# Patient Record
Sex: FEMALE | Race: WHITE | NOT HISPANIC OR LATINO | URBAN - METROPOLITAN AREA
[De-identification: names, ages, dates, MRNs, and addresses within clinical notes are randomized per-mention and may not be internally consistent; named-entity substitution may affect disease eponyms.]

---

## 2017-05-12 ENCOUNTER — OFFICE VISIT - RIVER FALLS (OUTPATIENT)
Dept: FAMILY MEDICINE | Facility: CLINIC | Age: 47
End: 2017-05-12

## 2017-05-12 ASSESSMENT — MIFFLIN-ST. JEOR: SCORE: 1205.09

## 2017-08-22 ENCOUNTER — OFFICE VISIT - RIVER FALLS (OUTPATIENT)
Dept: FAMILY MEDICINE | Facility: CLINIC | Age: 47
End: 2017-08-22

## 2017-08-22 ASSESSMENT — MIFFLIN-ST. JEOR: SCORE: 1204.41

## 2017-08-25 ENCOUNTER — TRANSFERRED RECORDS (OUTPATIENT)
Dept: HEALTH INFORMATION MANAGEMENT | Facility: CLINIC | Age: 47
End: 2017-08-25

## 2017-08-25 LAB — HPV ABSTRACT: NORMAL

## 2017-09-06 ENCOUNTER — OFFICE VISIT - RIVER FALLS (OUTPATIENT)
Dept: FAMILY MEDICINE | Facility: CLINIC | Age: 47
End: 2017-09-06

## 2017-09-26 ENCOUNTER — OFFICE VISIT - RIVER FALLS (OUTPATIENT)
Dept: FAMILY MEDICINE | Facility: CLINIC | Age: 47
End: 2017-09-26

## 2017-09-26 ASSESSMENT — MIFFLIN-ST. JEOR: SCORE: 1225.28

## 2018-02-23 ENCOUNTER — OFFICE VISIT - RIVER FALLS (OUTPATIENT)
Dept: FAMILY MEDICINE | Facility: CLINIC | Age: 48
End: 2018-02-23

## 2018-08-27 ENCOUNTER — OFFICE VISIT - RIVER FALLS (OUTPATIENT)
Dept: FAMILY MEDICINE | Facility: CLINIC | Age: 48
End: 2018-08-27

## 2018-08-27 ASSESSMENT — MIFFLIN-ST. JEOR: SCORE: 1265.42

## 2022-02-11 VITALS
BODY MASS INDEX: 25.23 KG/M2 | HEART RATE: 64 BPM | DIASTOLIC BLOOD PRESSURE: 70 MMHG | WEIGHT: 147.8 LBS | HEIGHT: 64 IN | SYSTOLIC BLOOD PRESSURE: 122 MMHG

## 2022-02-12 VITALS
SYSTOLIC BLOOD PRESSURE: 120 MMHG | WEIGHT: 135 LBS | HEART RATE: 72 BPM | BODY MASS INDEX: 22.86 KG/M2 | DIASTOLIC BLOOD PRESSURE: 68 MMHG | DIASTOLIC BLOOD PRESSURE: 82 MMHG | SYSTOLIC BLOOD PRESSURE: 110 MMHG | TEMPERATURE: 97.8 F | WEIGHT: 137.2 LBS | HEIGHT: 65 IN | HEART RATE: 80 BPM | BODY MASS INDEX: 22.47 KG/M2

## 2022-02-12 VITALS
HEIGHT: 65 IN | WEIGHT: 131 LBS | SYSTOLIC BLOOD PRESSURE: 126 MMHG | OXYGEN SATURATION: 99 % | HEART RATE: 87 BPM | DIASTOLIC BLOOD PRESSURE: 72 MMHG | BODY MASS INDEX: 21.83 KG/M2 | TEMPERATURE: 98.1 F

## 2022-02-12 VITALS
OXYGEN SATURATION: 97 % | WEIGHT: 143.2 LBS | SYSTOLIC BLOOD PRESSURE: 122 MMHG | HEART RATE: 71 BPM | BODY MASS INDEX: 23.83 KG/M2 | TEMPERATURE: 98 F | DIASTOLIC BLOOD PRESSURE: 86 MMHG

## 2022-02-12 VITALS
TEMPERATURE: 98.4 F | DIASTOLIC BLOOD PRESSURE: 82 MMHG | BODY MASS INDEX: 22.09 KG/M2 | HEIGHT: 65 IN | HEART RATE: 78 BPM | SYSTOLIC BLOOD PRESSURE: 122 MMHG | WEIGHT: 132.6 LBS

## 2022-02-15 NOTE — PROGRESS NOTES
Chief Complaint    Patient presents for medication check per P-feeling more calm.  History of Present Illness      Patient is here today to follow-up on her mood.  She is now stopped venlafaxine and Wellbutrin.  She has been taking duloxetine 60 mg in the morning and 30 mg in the evening.  She reports that she feels more calm and that she now thinks that she did not realize just how anxious she was in for how long she had been so anxious.  She wonders if she might have too little anxiety because she does not have quite as much of a drive to get things done but she reports she is still able to get things done that need to be done.  Her son is currently and a day program and patient reports that things are going well with this.  She also reports that her daughter is doing much better on her medication so the school year is going well so far.  We reviewed together dosing for the duloxetine and that we have not given duloxetine 60 mg a day and adequate therapeutic trial.  She has had no suicidal ideation and no homicidal ideation.       Review of systems negative except for HPI.  On exam general patient is alert and oriented ×3 in no acute distress.  HEENT pupils are equal round reactive to light mucous membranes are moist and pink hearing is grossly normal chest has bilateral rise with no increased work of breathing cardiovascular normal perfusion musculoskeletal normal gait psych patient dressed appropriately, will clean and well groomed.  She makes normal eye contact.  Her speech is fluent.  Her insight is improved.  She has less psychomotor agitation.  She seems a little blunted and anxious.  These are improved from our last visit.       Assessment and plan generalized anxiety disorder.  Will have patient take duloxetine 60 mg p.o. at bedtime.  Will see how her symptoms do with this 1 month from now.  This will give us a chance to see how adequately 60 mg a day and the duloxetine treat her symptoms.  We may  consider increasing the dose at that time.  In the meantime if her condition worsens she can certainly return to clinic sooner.  15 minutes spent with patient in direct face-to-face contact of which greater than 50% of the time spent counseling patient.  Physical Exam   Vitals & Measurements    T: 97.8(Tympanic)  HR: 72(Peripheral)  BP: 110/68     HT: 64.5 in  WT: 137.2 lb  BMI: 23.18   Assessment/Plan       Anxiety, generalized         Ordered:          venlafaxine, 1 cap(s) ( 37.5 mg ), PO, Daily, # 30 cap(s), 0 Refill(s), Type: Maintenance, Pharmacy: [facility] [id number], 1 cap(s) po daily          33509 office outpatient visit 15 minutes (Charge), Quantity: 1, Depression, Major  Anxiety, generalized                Depression, Major         Ordered:          venlafaxine, 1 cap(s) ( 37.5 mg ), PO, Daily, # 30 cap(s), 0 Refill(s), Type: Maintenance, Pharmacy: [facility] [id number], 1 cap(s) po daily          06821 office outpatient visit 15 minutes (Charge), Quantity: 1, Depression, Major  Anxiety, generalized                Orders:         DULoxetine, 1 cap(s) ( 60 mg ), po, bid, # 60 cap(s), 1 Refill(s), Type: Hard Stop, Pharmacy: [facility] [id number]         DULoxetine, 1 cap(s) ( 60 mg ), po, bid, # 60 cap(s), 1 Refill(s), Type: Maintenance, Pharmacy: [facility] [address], 1 cap(s) po bid  Problem List/Past Medical History    Ongoing     Anxiety, generalized     Depression, Major     Female infertility    Historical     Spontaneous   Procedure/Surgical History     LASIK (2002)     Nose reconstruction     Uterine surgery for infertility  Medications    ALPRAZolam 0.25 mg oral tablet, 0.25 mg= 1 tab(s), po, daily, PRN, 1 refills    DULoxetine 30 mg oral delayed release capsule, 30 mg= 1 cap(s), po, qhs, 1 refills    DULoxetine 60 mg oral delayed release capsule, 60 mg= 1 cap(s), po, bid, 1 refills    Flonase 50 mcg/inh nasal spray, 2 spray(s), nasal, daily, 1 refills     hydrOXYzine hydrochloride 10 mg oral tablet, See Instructions, PRN, 1 refills    ibuprofen, prn  Allergies    No Known Medication Allergies  Social History    Smoking Status - 09/26/2017     Never smoker     Alcohol - 08/23/2017      Current, 3-4 times per week, 1 drinks/episode average.     Employment and Education - 08/23/2017      Employed, Work/School description: Teacher.  Immunizations      Vaccine Date Status Comments      influenza virus vaccine, inactivated 12/27/2012 Recorded [facility]      tetanus/diphth/pertuss (Tdap) adult/adol 03/26/2012 Recorded [facility]      influenza 12/30/2010 Recorded  Lab Results      Results (Last 90 days)      No results located.

## 2022-02-15 NOTE — TELEPHONE ENCOUNTER
Entered by Simin Reid MD on February 01, 2019 6:48:46 PM CST  ---------------------  From: Simin Reid MD   To: [id number]    Sent: 2/1/2019 6:48:45 PM CST  Subject: Medication Management     ** Not Approved: Patient needs appointment **  DULoxetine (DULOXETINE DR 60MG CAPSULES)  TAKE ONE CAPSULE BY MOUTH EVERY DAY  Qty:  30 cap(s)        Days Supply:  30        Refills:  0          GUIDO     Route To Pharmacy - [facility] [id number]               ------------------------------------------  From: [id number]  To: Simin Reid MD  Sent: January 30, 2019 3:41:05 AM CST  Subject: Medication Management  Due: January 31, 2019 3:41:05 AM CST    ** On Hold Pending Signature **  Drug: DULoxetine (DULoxetine 60 mg oral delayed release capsule)  TAKE 1 CAPSULE BY MOUTH DAILY, PLEASE SEE YOUR DOCTOR BEFORE YOUR NEXT REFILL  Quantity: 30 cap(s)     Days Supply: 0         Refills: 0  Substitutions Allowed  Notes from Pharmacy:     Dispensed Drug: DULoxetine (DULoxetine 60 mg oral delayed release capsule)  TAKE ONE CAPSULE BY MOUTH EVERY DAY  Quantity: 30 cap(s)     Days Supply: 30        Refills: 0  Substitutions Allowed  Notes from Pharmacy:   ------------------------------------------

## 2022-02-15 NOTE — PROGRESS NOTES
Patient:   DOTTY ISAAC            MRN: [id number]            FIN: [id number]               Age:   46 years     Sex:  Female     :  1970   Associated Diagnoses:   Generalized anxiety disorder   Author:   Param Burns MD      Visit Information      Date of Service: 2017 11:32 am  Performing Location: [facility]  Encounter#: [id number]      Primary Care Provider (PCP):  Param Burns MD    NPI# [id number]      Referring Provider:  No referring provider recorded for selected visit.      Chief Complaint   2017 11:36 AM CDT   Patient presents today for a medication check and refill of her alprazolam 0.25 mg, 1 tab q6hrs PRN anxiety.        History of Present Illness   Needs FMLA signed. Aman is doing the IEP at school. Son diagnosed with clinical  autistic. Getting 20 hours a week of . Doing well with antidepressants. Last time filled Alprazolam  for 30 tabs. In past has taken infrequently and about once a month. Last fill had been 2015. Now with stress of son has been using once daily.  Feels like functioning well and does not need to change the Wellbutrin and Effexor.      Review of Systems   Constitutional:  No fever.    Gastrointestinal:  No vomiting.    Integumentary:  No rash.    PHQ-9: 18pts (May 2017)  EFRAÍN-7: 21pts (May 2017)      Health Status   Allergies:    Allergic Reactions (Selected)  No Known Medication Allergies   Medications:  (Selected)   Prescriptions  Prescribed  ALPRAZolam 0.25 mg oral tablet: 1 tab(s) ( 0.25 mg ), PO, q6 hrs, PRN: for anxiety, # 30 tab(s), 0 Refill(s), Type: Maintenance, Pharmacy: [facility] [address], Due for visit., 1 tab(s) po q6 hrs,PRN:for anxiety  buPROPion 150 mg/24 hours (XL) oral tablet, extended release: 1 tab(s) ( 150 mg ), po, daily, # 90 tab(s), 3 Refill(s), Type: Maintenance, Pharmacy: [facility] [address], due for appt for further refills, 1 tab(s) po daily  venlafaxine  150 mg oral capsule, extended release: 1 cap(s) ( 150 mg ), po, daily, # 90 tab(s), 3 Refill(s), Type: Maintenance, Pharmacy: [facility] [address], due for appt for further refills, 1 cap(s) po daily  Documented Medications  Documented  ibuprofen: prn, Type: Maintenance   Problem list:    All Problems  Female infertility / SNOMED CT 79013041 / Confirmed  Anxiety, generalized / SNOMED CT 15299051 / Confirmed  Depression, Major / SNOMED CT 072672753 / Confirmed  Resolved: Spontaneous  / SNOMED CT 13361641  Canceled: Uterine surgery for infertility      Histories   Family History: Father suicide in his 30's. MGF suicide 30's   Procedure history:    LASIK (SNOMED CT 4039826480) on 2002 at 31 Years.  Comments:  2013 11:15 AM - Lesly Lr.  Uterine surgery for infertility.   Social History: Three children.  (Miles). Works at [employer]. Children adopted      Physical Examination   Vital Signs   2017 11:36 AM CDT Temperature Tympanic 98.1 DegF    Peripheral Pulse Rate 87 bpm    Systolic Blood Pressure 126 mmHg    Diastolic Blood Pressure 72 mmHg    Oxygen Saturation 99 %      General:  Alert and oriented, No acute distress.    Neck:  No lymphadenopathy.    Respiratory:  Lungs are clear to auscultation.    Cardiovascular:  Normal rate, Regular rhythm.    Gastrointestinal:  Soft, Non-tender, Non-distended.    Musculoskeletal:  Normal gait.    Psychiatric:  Cooperative, Appropriate mood & affect, Non-suicidal.       Impression and Plan   Diagnosis     Generalized anxiety disorder (ASK03-TX F41.1).     Will give one month alprazolam. If needs refill in one month then need to consider increasing wellbutrin or effexor and switching to clonazepam

## 2022-02-15 NOTE — PROGRESS NOTES
Chief Complaint    f/u anxiety.  History of Present Illness      Creatinine to 100 mg of the 12 hour extended release twice daily and venlafaxine 75 mg reports that she has not had any significant increase or decrease in the number of Xanax that she feels she needs.  Cries less frequently and feels a little bit more calm.  Her mouth is a little bit drier.  Had no nausea and no diarrhea.  Denies any worsening of mood       Review of systems is as per HPI otherwise negative       Exam general alert and oriented ×3 in no acute distress, HEENT pupils equally round and reactive to light extraocular motion is intact hearing is grossly normal mucous membranes moist.  Musculoskeletal gait is normal.  Chest bilateral rise with no increased work of breathing cardiovascular psych patient seems more calm today, her affect is a little zarate with less anxiety grams appropriately for the weather casual well-groomed her being is clear.  Thought process seems intact.       Assessment and plan mixed depression and anxiety.  For the next 1 week patient will take duloxetine 30 mg 2 in the morning and 1 at night.  She will stop taking her buproprion and decrease her venlafaxine to the 37.5 mg extended release and do 1 pill once daily for 1 week.  One week later she will increase her duloxetine to 30 mg 2 pills twice daily for total of 60 mg twice daily and will stop taking her venlafaxine 37.5 mg.  Will have patient remain on this for 4 weeks we will have patient return to clinic in 5 weeks for follow-up however she is promised to return to clinic sooner if she notices any worsening of mood or adverse side effects.       15 minutes was spent with patient in direct face-to-face contact of which greater than 50% of the time was spent counseling patient and coordinating care.  Physical Exam   Vitals & Measurements    HR: 80(Peripheral)  BP: 120/82     WT: 135 lb   Assessment/Plan       Anxiety, generalized         Ordered:           buPROPion, 1 tab(s) ( 100 mg ), po, bid, # 28 tab(s), 1 Refill(s), Type: Maintenance, Pharmacy: [facility] [address], 1 tab(s) po bid,x14 day(s)          venlafaxine, 1 cap(s) ( 37.5 mg ), PO, Daily, # 30 cap(s), 0 Refill(s), Type: Maintenance, Pharmacy: [facility] [address], 1 cap(s) po daily          09440 office outpatient visit 15 minutes (Charge), Quantity: 1, Depression, Major  Anxiety, generalized          Return to Clinic (Request), RFV: follow up mood, Return in 5 weeks                Depression, Major         Ordered:          venlafaxine, 1 cap(s) ( 37.5 mg ), PO, Daily, # 30 cap(s), 0 Refill(s), Type: Maintenance, Pharmacy: [facility] [address], 1 cap(s) po daily          66744 office outpatient visit 15 minutes (Charge), Quantity: 1, Depression, Major  Anxiety, generalized          Return to Clinic (Request), RFV: follow up mood, Return in 5 weeks                Orders:         DULoxetine, See Instructions, Instructions: 2 cap(s) po qam and 1 po qhs x 1 week then 2 po bid., # 120 EA, 1 Refill(s), Type: Maintenance, Pharmacy: [facility] [address], 2 cap(s) po qam and 1 po qhs x 1 week then 2 po bid.         venlafaxine, 1 cap(s) ( 75 mg ), PO, Daily, # 14 cap(s), 1 Refill(s), Type: Maintenance, Pharmacy: [facility] [address], 1 cap(s) po daily,x14 day(s)  Problem List/Past Medical History    Ongoing     Anxiety, generalized     Depression, Major     Female infertility    Historical     Spontaneous   Procedure/Surgical History     LASIK (2002)     Nose reconstruction     Uterine surgery for infertility  Medications    ALPRAZolam 0.25 mg oral tablet, 0.25 mg= 1 tab(s), po, daily, PRN, 1 refills    DULoxetine 30 mg oral delayed release capsule, See Instructions, 1 refills    Effexor XR 37.5 mg oral capsule, extended release, 37.5 mg= 1 cap(s), po, daily    Flonase 50 mcg/inh nasal spray, 2 spray(s), nasal, daily, 1 refills    hydrOXYzine hydrochloride 10 mg oral  tablet, See Instructions, PRN, 1 refills    ibuprofen, prn  Allergies    No Known Medication Allergies  Social History    Smoking Status - 09/06/2017     Never smoker     Alcohol - 08/23/2017      Current, 3-4 times per week, 1 drinks/episode average.     Employment and Education - 08/23/2017      Employed, Work/School description: Teacher.  Immunizations      Vaccine Date Status Comments      influenza virus vaccine, inactivated 12/27/2012 Recorded [facility]      tetanus/diphth/pertuss (Tdap) adult/adol 03/26/2012 Recorded [facility]      influenza 12/30/2010 Recorded  Lab Results      Results (Last 90 days)      No results located.  should say buproprion not propia in the HPI

## 2022-02-15 NOTE — PROGRESS NOTES
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    Eye   Ear/Nose/Mouth/Throat:  No nasal congestion, No sore throat.    Respiratory:  No shortness of breath, No cough.    Cardiovascular   Breast   Gastrointestinal:  No nausea, No vomiting, No diarrhea, No constipation.    Genitourinary:  No dysuria.    Gynecologic   Hematology/Lymphatics:  No bruising tendency, No swollen lymph glands.    Endocrine   Immunologic:  No recurrent fevers, No recurrent infections.    Musculoskeletal:  No muscle pain.    Integumentary:  No rash.    Neurologic:  No tingling, No headache.    Psychiatric   All other systems.     Health Status   Allergies:    Allergic Reactions (Selected)  No Known Medication Allergies   Medications:  (Selected)   Prescriptions  Prescribed  DULoxetine 60 mg oral delayed release capsule: See Instructions, Instructions: TAKE 1 CAPSULE BY MOUTH DAILY, please see your doctor before your next refill, # 30 cap(s), 0 Refill(s), Type: Maintenance, Pharmacy: [facility] [address]  hydrOXYzine hydrochloride 10 mg oral tablet: See Instructions, Instructions: 1-2 tab(s) po qid, PRN: as needed for anxiety, # 100 EA, 1 Refill(s), Type: Maintenance,  Pharmacy: [facility] [address], 1-2 tab(s) po qid,PRN:as needed for anxiety  Documented Medications  Documented  ibuprofen: prn, Type: Maintenance   Problem list:    All Problems (Selected)  Female infertility / 77110152 / Confirmed  Anxiety, generalized / [id number] / Confirmed  Depression, Major / [id number] / Confirmed      Histories   Past Medical History:    Active  Female infertility (33551872)  Resolved  Spontaneous  (05344350):  Resolved.  Comments:  2013 CST 11:24 AM CST - Lesly Lr   Miscarriage x 5.   Family History:    No family history items have been selected or recorded.   Procedure history:    LASIK (SNOMED CT 1481686462) on 2002 at 31 Years.  Comments:  2013 11:15 AM - Lesly Lr  Bilateral.  Uterine surgery for infertility.  Nose reconstruction (SNOMED CT 5335968157).   Social History:        Alcohol Assessment            Current, 3-4 times per week, 1 drinks/episode average.      Employment and Education Assessment            Employed, Work/School description: Teacher.        Physical Examination   Vital Signs   2018 3:45 PM CDT Peripheral Pulse Rate 64 bpm    Systolic Blood Pressure 122 mmHg    Diastolic Blood Pressure 70 mmHg    Mean Arterial Pressure 87 mmHg      Measurements from flowsheet : Measurements   2018 3:45 PM CDT Height Measured - Standard 64.00 in    Weight Measured - Standard 147.8 lb    BSA 1.74 m2    Body Mass Index 25.37 kg/m2  HI      General:  Alert and oriented, No acute distress.    Eye:  Pupils are equal, round and reactive to light, Normal conjunctiva.    HENT:  Oral mucosa is moist.    Neck:  Supple, No lymphadenopathy.    Respiratory:  Lungs are clear to auscultation, Respirations are non-labored.    Cardiovascular:  Normal rate, Regular rhythm, No edema.    Gastrointestinal:  Non-distended, No organomegaly.    Musculoskeletal:  Normal strength, No swelling, No deformity, Normal gait.    Integumentary:  Warm, No rash.     Psychiatric:  Cooperative, Appropriate mood & affect, Normal judgment.       Impression and Plan   Diagnosis     Physical exam, pre-employment (GTQ22-XA Z02.1).     Plan:  no job restrictions  low risk for TB.

## 2022-02-15 NOTE — PROGRESS NOTES
Chief Complaint    Anxiety medication check.  History of Present Illness      Patient is here today with her 2 sons to follow-up on her mood.  She reports that her mood is doing better than it has been years.  She is taking duloxetine 60 mg 1 p.o. nightly.  She also has stopped taking the alprazolam and is found that taking the hydroxyzine 10 mg 2 p.o. nightly is helping her rest peacefully and seems to be working better for her than the alprazolam dead.  Unfortunately she still very tired during the day.  This has not increased with the use of the duloxetine or with the addition of the hydroxyzine.  The hydroxyzine has not made this any worse than prior to using the hydroxyzine.  She feels sleepy all day and also reports that her  says that she snores.  Other than this work seems to be going well and she is in good spirits.       Review of systems as per HPI and otherwise negative       General patient is alert and oriented ×3 in no acute distress HEENT pupils are equal round and reactive to light conjunctiva is not injected hearing is grossly normal nares are patent there is no rhinorrhea skin is warm and dry there is no rashes present on the exposed areas on her arms and chest and face.  Neuro cranial nerves II through XII are grossly intact her memory is intact and her speech is clear and concise.  Chest has bilateral rise with no increased work of breathing cardiovascular patient has normal tissue perfusion.  Psych patient is calm and has a full affect.  She is very patient with her boys in the room.  She is dressed appropriately for the weather clean and well-groomed and making good eye contact.       Assessment and plan patient with some anxiety depression which seemed very well controlled with her duloxetine will plan to renew this for 90 day supply with 1 refill.  Patient also doing very well with her hydroxyzine usually using 2 of the 10 mg pills at bedtime we will plan to renew this for 3 month  supply with one additional pill to use as needed for anxiety.  She is aware that we should follow-up in about 6 months.  She can certainly return to see me sooner if she sees any need to.       #2 hypersomnolence patient does not have a habitus that makes me more concerned about obstructive sleep apnea so I would like her to meet with 1 of our sleep specialist rather than ordering a sleep study for her.  Referral to sleep medicine was placed today.  Physical Exam   Vitals & Measurements    T: 98.0(Tympanic)  HR: 71(Peripheral)  BP: 122/86  SpO2: 97%     HT: 64.5 in  WT: 143.2 lb   Assessment/Plan       Anxiety, generalized         Ordered:          09130 office outpatient visit 15 minutes (Charge), Quantity: 1, Anxiety, generalized  Depression, Major  Hypersomnolence                Depression, Major         Ordered:          79632 office outpatient visit 15 minutes (Charge), Quantity: 1, Anxiety, generalized  Depression, Major  Hypersomnolence                Hypersomnolence         Ordered:          92686 office outpatient visit 15 minutes (Charge), Quantity: 1, Anxiety, generalized  Depression, Major  Hypersomnolence          Referral (Request), 02/23/18 17:17:00 CST, Referred to: Other, Referred to: sleep medicine, Hypersomnolence                Orders:         DULoxetine, See Instructions, Instructions: TAKE 1 CAPSULE BY MOUTH TWICE DAILY, # 60 cap(s), Type: Hard Stop, Pharmacy: [facility] [address]         DULoxetine, 1 cap(s) ( 60 mg ), po, daily, # 90 cap(s), 1 Refill(s), Type: Soft Stop, Pharmacy: [facility] [address], 1 cap(s) po daily         hydrOXYzine, See Instructions, Instructions: TAKE 1 TO 2 TABLETS BY MOUTH FOUR TIMES DAILY AS NEEDED FOR ANXIETY, # 100 tab(s), Type: Hard Stop, Pharmacy: [facility] [address]         hydrOXYzine, See Instructions, Instructions: TAKE 1 TO 2 TABLETS BY MOUTH FOUR TIMES DAILY AS NEEDED FOR ANXIETY, # 270 EA, 1 Refill(s), Type: Soft Stop, [facility], TAKE 1 TO 2 TABLETS BY MOUTH FOUR TIMES DAILY AS NEEDED FOR ANXIETY  Patient Information     Name:DOTTY ISAAC      Address:      [address] [address]     Sex:Female     YOB: 1970     [phone]     MRN:[id number]     FIN:[id number]     Location:[facility]     Date of Service:2018      Primary Care Physician:       Param Burns MD, [phone]  Problem List/Past Medical History    Ongoing     Anxiety, generalized     Depression, Major     Female infertility    Historical     Spontaneous       Comments: Miscarriage x 5.  Procedure/Surgical History     LASIK (2002)     Nose reconstruction     Uterine surgery for infertility  Medications        DULoxetine 60 mg oral delayed release capsule: 60 mg, 1 cap(s), po, daily, 90 cap(s), 1 Refill(s).        fluticasone 50 mcg/inh nasal spray: See Instructions, SHAKE LIQUID AND USE 2 SPRAYS IN EACH NOSTRIL DAILY, 16 mL.        hydrOXYzine hydrochloride 10 mg oral tablet: See Instructions, 1-2 tab(s) po qid, PRN: as needed for anxiety, 100 EA, 1 Refill(s).        hydrOXYzine hydrochloride 10 mg oral tablet: See Instructions, TAKE 1 TO 2 TABLETS BY MOUTH FOUR TIMES DAILY AS NEEDED FOR ANXIETY, 270 EA, 1 Refill(s).        ibuprofen: prn.                Allergies    No Known Medication Allergies  Social History    Smoking Status - 2018     Former smoker     Alcohol - 2017      Current, 3-4 times per week, 1 drinks/episode average.     Employment and Education - 2017      Employed, Work/School description: Teacher.  Immunizations      Vaccine Date Status Comments      influenza virus vaccine, inactivated 2012 Recorded [facility]      tetanus/diphth/pertuss (Tdap) adult/adol 2012 Recorded [facility]      influenza 2010 Recorded  Lab Results      Results (Last 90 days)      No results located.

## 2022-02-15 NOTE — PROGRESS NOTES
Patient:   DOTTY ISAAC            MRN: [id number]            FIN: [id number]               Age:   46 years     Sex:  Female     :  1970   Associated Diagnoses:   Well woman exam; Anxiety, generalized; Encounter for general adult medical examination with abnormal findings; Screening for breast cancer; Screening for cervical cancer; Serous otitis media   Author:   Simin Reid MD      Visit Information      Date of Service: 2017 03:00 pm  Performing Location: [facility]  Encounter#: [id number]      Primary Care Provider (PCP):  Param Burns MD    NPI# [id number]      Referring Provider:  Simin Reid MD    NPI# [id number]      Chief Complaint   2017 3:07 PM CDT    Px        Well Adult History   Well Adult History             The patient presents for well adult exam.  The patient's general health status is described as good.  The patient's diet is described as balanced.  Exercise: occasional.  Associated symptoms consist of anxiety, also see phq 9.  currently poorly controlled but no SI or HI, has appt next month to establish care with psychiatry.  has been on wellbutrin 150 24  ER and venlafaxine and occasional prn xanax for a long time but no longer controlling symptms well.     Cervical cancer screening:_  Breast cancer screening_  Lipid       Review of Systems   Constitutional:  Negative except as documented in history of present illness.    Eye:  Negative except as documented in history of present illness.    Ear/Nose/Mouth/Throat:  Negative except as documented in history of present illness.    Respiratory:  Negative except as documented in history of present illness.    Cardiovascular:  Negative except as documented in history of present illness.    Gastrointestinal:  Negative except as documented in history of present illness.    Genitourinary:  Negative except as documented in history of present illness.    Gynecologic:  Negative except as documented in history of  present illness.    Hematology/Lymphatics:  Negative except as documented in history of present illness.    Endocrine:  Negative except as documented in history of present illness.    Immunologic:  Negative except as documented in history of present illness.    Musculoskeletal:  Negative except as documented in history of present illness.    Integumentary:  Negative except as documented in history of present illness.    Neurologic:  Negative except as documented in history of present illness.    Psychiatric:  Negative except as documented in history of present illness.              Health Status   Allergies:    Allergic Reactions (Selected)  No Known Medication Allergies   Medications:  (Selected)   Prescriptions  Prescribed  ALPRAZolam 0.25 mg oral tablet: 1 tab(s) ( 0.25 mg ), PO, daily, PRN: for anxiety, # 30 tab(s), 1 Refill(s), Type: Maintenance, Due for visit.  Cymbalta 30 mg oral delayed release capsule: 1 cap(s) ( 30 mg ), po, bid, # 60 cap(s), 0 Refill(s), Type: Maintenance, Pharmacy: [facility] [id number], 1 cap(s) po bid  Flonase 50 mcg/inh nasal spray: 2 spray(s), nasal, daily, # 1 EA, 1 Refill(s), Type: Maintenance, Pharmacy: [facility] [id number], 2 spray(s) nasal daily  buPROPion 100 mg/12 hours (SR) oral tablet, extended release: 1 tab(s) ( 100 mg ), po, bid, # 28 tab(s), 1 Refill(s), Type: Maintenance, Pharmacy: [facility] [id number], 1 tab(s) po bid,x14 day(s)  hydrOXYzine hydrochloride 10 mg oral tablet: See Instructions, Instructions: 1-2 tab(s) po qid, PRN: as needed for anxiety, # 100 EA, 1 Refill(s), Type: Maintenance, Pharmacy: [facility] [id number], 1-2 tab(s) po qid,PRN:as needed for anxiety  venlafaxine 75 mg oral capsule, extended release: 1 cap(s) ( 75 mg ), PO, Daily, # 14 cap(s), 1 Refill(s), Type: Maintenance, Pharmacy: [facility] [id number], 1 cap(s) po daily,x14 day(s)  Documented Medications  Documented  ibuprofen: prn, Type: Maintenance   Problem list:     All Problems  Female infertility / SNOMED CT 56482236 / Confirmed  Anxiety, generalized / SNOMED CT 71679463 / Confirmed  Depression, Major / SNOMED CT 820172887 / Confirmed  Resolved: Spontaneous  / SNOMED CT 91939025  Canceled: Uterine surgery for infertility      Histories   Past Medical History:    Active  Female infertility ([id number])  Resolved  Spontaneous  ([id number]):  Resolved.  Comments:  2013 CST 11:24 AM CST - [facility]   Miscarriage x 5.   Family History:    No family history items have been selected or recorded.   Procedure history:    LASIK ([id number]) on 2002 at 31 Years.  Comments:  2013 11:15 AM - [facility]  Bilateral.  Uterine surgery for infertility.   Social History:        Alcohol Assessment: Current            Current, 3-4 times per week, 1 drinks/episode average.        Physical Examination   Last Menstrual Period: 8/15/2017     Vital Signs   2017 3:07 PM CDT Temperature Tympanic 98.4 DegF    Peripheral Pulse Rate 78 bpm    HR Method Manual    Systolic Blood Pressure 122 mmHg    Diastolic Blood Pressure 82 mmHg    Mean Arterial Pressure 95 mmHg    BP Method Manual      Measurements from flowsheet : Measurements   2017 3:07 PM CDT Height Measured - Standard 64.5 in    Weight Measured - Standard 132.6 lb    BSA 1.65 m2    Body Mass Index 22.41 kg/m2      General:  Alert and oriented, No acute distress.    Eye:  Pupils are equal, round and reactive to light, Extraocular movements are intact, Normal conjunctiva.    HENT:  Normocephalic, Oral mucosa is moist, No pharyngeal erythema, No sinus tenderness, bilateral TM dull gry and no light reflex and retracted with air fluid meniscus present.    Neck:  Supple, Non-tender, No jugular venous distention, No lymphadenopathy, No thyromegaly.    Respiratory:  Lungs are clear to auscultation, Respirations are non-labored, Breath sounds are equal, Symmetrical chest wall expansion, No chest wall tenderness.     Cardiovascular:  Normal rate, Regular rhythm, No murmur, No gallop, Normal peripheral perfusion.    Gastrointestinal:  Soft, Non-tender, Non-distended, Normal bowel sounds.    Musculoskeletal:  Normal range of motion, No deformity, Normal gait.    Integumentary:  Warm, Dry, No rash.    Neurologic:  Alert, Oriented, CN 2-12 grossly intact.    Psychiatric:  Cooperative, Appropriate mood & affect, Normal judgment, Non-suicidal.         Mood and affect: Anxious, Sad.         Behavior: Pressured speech, Restless.         Judgment: Appropriate in social situations.         Abnormal / Psychotic thoughts: No delusions, No homicidal ideation, No preoccupation with violence, No suicidal ideation.         Thought process: Appropriate.       Impression and Plan   Diagnosis     Well woman exam (HKS66-XP Z01.419).     Course:  Progressing as expected.    Orders     Orders (Selected)   Outpatient Orders  Ordered (Dispatched)  Direct LDL* ([facility]): Specimen Type: Serum, Collection Date: 08/22/17 15:54:00 CDT  Hemoglobin A1c* ([facility]): Specimen Type: Blood, Collection Date: 08/22/17 15:54:00 CDT  TSH* ([facility]): Specimen Type: Serum, Collection Date: 08/22/17 15:54:00 CDT  Ordered (In Transit)  ThinPrep Imaging Pap and HPV mRNA E6/E7* ([facility]): Specimen Type: Pap, Collection Date: 08/22/17 15:50:00 CDT.     Diagnosis     Anxiety, generalized (JXM34-IK F41.1).     Encounter for general adult medical examination with abnormal findings (TXR87-PH Z00.01).     Screening for breast cancer (KPF93-QA Z12.39).     Screening for cervical cancer (HTQ51-ZS Z12.4).     Serous otitis media (DPS45-LG H65.90).     Well woman exam (ZUD62-TH Z01.419).     return to clinic if symptoms worsen or do not improve and in 1-2 weeks for med check.     Plan:  25 minutes spent with patient in direct face to face contact >50% spent counseling regarding anxiety currently poorly controlled, consider trying nasal saline irrigation2-3 x daily such as netti pot,  suggest boiled water that has returned to room temp or bottled water and salt pack available otc for saline rinse.    Orders     Orders (Selected)   Prescriptions  Prescribed  ALPRAZolam 0.25 mg oral tablet: 1 tab(s) ( 0.25 mg ), PO, daily, PRN: for anxiety, # 30 tab(s), 1 Refill(s), Type: Maintenance, Due for visit.  Cymbalta 30 mg oral delayed release capsule: 1 cap(s) ( 30 mg ), po, bid, # 60 cap(s), 0 Refill(s), Type: Maintenance, [facility]acy: [id number], 1 cap(s) po bid  Flonase 50 mcg/inh nasal spray: 2 spray(s), nasal, daily, # 1 EA, 1 Refill(s), Type: Maintenance, Pharmacy: [facility] [id number], 2 spray(s) nasal daily  buPROPion 100 mg/12 hours (SR) oral tablet, extended release: 1 tab(s) ( 100 mg ), po, bid, # 28 tab(s), 1 Refill(s), Type: Maintenance, Pharmacy: [facility] [id number], 1 tab(s) po bid,x14 day(s)  hydrOXYzine hydrochloride 10 mg oral tablet: See Instructions, Instructions: 1-2 tab(s) po qid, PRN: as needed for anxiety, # 100 EA, 1 Refill(s), Type: Maintenance, Pharmacy: [id number], 1-2 tab(s) po qid,PRN:as needed for anxiety  venlafaxine 75 mg oral capsule, extended release: 1 cap(s) ( 75 mg ), PO, Daily, # 14 cap(s), 1 Refill(s), Type: Maintenance, Pharmacy: [id number], 1 cap(s) po daily,x14 day(s).

## 2022-02-15 NOTE — PROCEDURES
Accession Number:       [id number]  CLINICAL INFORMATION::     Normal exam  LMP::     NONE GIVEN  PREV. PAP::     NONE GIVEN  PREV. BX::     NONE GIVEN  SOURCE::     Cervix, Endocervix  STATEMENT OF ADEQUACY::     Satisfactory for evaluation. Endocervical/transformation zone component present. Age and/or menstrual status not provided  INTERPRETATION/RESULT::     Negative for intraepithelial lesion or malignancy.  COMMENT::     This Pap test has been evaluated with computer assisted technology.  CYTOTECHNOLOGIST::     LINA KRISHNA(ASCP) CT Screening location: [place] [address]  HPV mRNA E6/E7:     Not Detected       This test was performed using the APTIMA HPV Assay ([employer]       This assay detects E6/E7 viral messenger RNA (mRNA) from 14       high-risk HPV types (16,18,31,33,35,39,45,51,52,56,58,59,66,68).